# Patient Record
Sex: MALE | URBAN - METROPOLITAN AREA
[De-identification: names, ages, dates, MRNs, and addresses within clinical notes are randomized per-mention and may not be internally consistent; named-entity substitution may affect disease eponyms.]

---

## 2017-09-29 NOTE — PATIENT DISCUSSION
CATARACTS, OU - NOT VISUALLY SIGNIFICANT. DISC OPTION OF MRV-US-JXHEOT. GLASSES RX GIVEN TO FILL IF DESIRES. RE-EVALUATE IN 6 MOS.

## 2022-07-09 ENCOUNTER — TELEPHONE ENCOUNTER (OUTPATIENT)
Dept: URBAN - METROPOLITAN AREA CLINIC 121 | Facility: CLINIC | Age: 62
End: 2022-07-09

## 2022-07-10 ENCOUNTER — TELEPHONE ENCOUNTER (OUTPATIENT)
Dept: URBAN - METROPOLITAN AREA CLINIC 121 | Facility: CLINIC | Age: 62
End: 2022-07-10

## 2022-07-15 ENCOUNTER — NEW PATIENT (OUTPATIENT)
Dept: URBAN - METROPOLITAN AREA CLINIC 26 | Facility: CLINIC | Age: 62
End: 2022-07-15

## 2022-07-15 VITALS — HEIGHT: 68 IN | BODY MASS INDEX: 24.4 KG/M2 | WEIGHT: 161 LBS

## 2022-07-15 DIAGNOSIS — E11.3413: ICD-10-CM

## 2022-07-15 DIAGNOSIS — H04.129: ICD-10-CM

## 2022-07-15 DIAGNOSIS — I10: ICD-10-CM

## 2022-07-15 DIAGNOSIS — H35.63: ICD-10-CM

## 2022-07-15 PROCEDURE — 92134 CPTRZ OPH DX IMG PST SGM RTA: CPT

## 2022-07-15 PROCEDURE — 92004 COMPRE OPH EXAM NEW PT 1/>: CPT

## 2022-07-15 PROCEDURE — 92235 FLUORESCEIN ANGRPH MLTIFRAME: CPT

## 2022-07-15 PROCEDURE — 92250 FUNDUS PHOTOGRAPHY W/I&R: CPT

## 2022-07-15 ASSESSMENT — TONOMETRY
OS_IOP_MMHG: 7
OD_IOP_MMHG: 15

## 2022-07-15 ASSESSMENT — VISUAL ACUITY
OD_CC: 20/200+1
OS_CC: 20/200

## 2022-07-22 ENCOUNTER — CLINIC PROCEDURE ONLY (OUTPATIENT)
Dept: URBAN - METROPOLITAN AREA CLINIC 26 | Facility: CLINIC | Age: 62
End: 2022-07-22

## 2022-07-22 DIAGNOSIS — E11.3413: ICD-10-CM

## 2022-07-22 PROCEDURE — 67028 INJECTION EYE DRUG: CPT

## 2022-07-22 ASSESSMENT — TONOMETRY: OD_IOP_MMHG: 18

## 2022-07-29 ENCOUNTER — CLINIC PROCEDURE ONLY (OUTPATIENT)
Dept: URBAN - METROPOLITAN AREA CLINIC 26 | Facility: CLINIC | Age: 62
End: 2022-07-29

## 2022-07-29 DIAGNOSIS — E11.3413: ICD-10-CM

## 2022-07-29 PROCEDURE — 67028 INJECTION EYE DRUG: CPT

## 2022-07-29 ASSESSMENT — TONOMETRY: OS_IOP_MMHG: 13

## 2022-08-12 ENCOUNTER — TECH ONLY (OUTPATIENT)
Dept: URBAN - METROPOLITAN AREA CLINIC 26 | Facility: CLINIC | Age: 62
End: 2022-08-12

## 2022-08-12 DIAGNOSIS — I10: ICD-10-CM

## 2022-08-12 DIAGNOSIS — H35.63: ICD-10-CM

## 2022-08-12 DIAGNOSIS — H04.129: ICD-10-CM

## 2022-08-12 DIAGNOSIS — E11.3413: ICD-10-CM

## 2022-08-12 PROCEDURE — 99211 OFF/OP EST MAY X REQ PHY/QHP: CPT

## 2022-08-12 ASSESSMENT — TONOMETRY
OS_IOP_MMHG: 07
OD_IOP_MMHG: 13

## 2022-08-12 ASSESSMENT — VISUAL ACUITY
OD_SC: 20/80-2
OS_SC: 20/80-1

## 2022-09-28 ENCOUNTER — OFFICE VISIT (OUTPATIENT)
Dept: URBAN - METROPOLITAN AREA CLINIC 63 | Facility: CLINIC | Age: 62
End: 2022-09-28